# Patient Record
Sex: MALE | Race: WHITE
[De-identification: names, ages, dates, MRNs, and addresses within clinical notes are randomized per-mention and may not be internally consistent; named-entity substitution may affect disease eponyms.]

---

## 2020-05-22 ENCOUNTER — HOSPITAL ENCOUNTER (EMERGENCY)
Dept: HOSPITAL 53 - M ED | Age: 45
Discharge: HOME | End: 2020-05-22
Payer: COMMERCIAL

## 2020-05-22 VITALS — BODY MASS INDEX: 24.56 KG/M2 | WEIGHT: 191.36 LBS | HEIGHT: 74 IN

## 2020-05-22 VITALS — SYSTOLIC BLOOD PRESSURE: 120 MMHG | DIASTOLIC BLOOD PRESSURE: 67 MMHG

## 2020-05-22 DIAGNOSIS — M25.519: ICD-10-CM

## 2020-05-22 DIAGNOSIS — M54.2: Primary | ICD-10-CM

## 2020-05-22 DIAGNOSIS — R50.9: ICD-10-CM

## 2020-05-22 PROCEDURE — 99283 EMERGENCY DEPT VISIT LOW MDM: CPT

## 2020-05-22 PROCEDURE — 71045 X-RAY EXAM CHEST 1 VIEW: CPT

## 2020-05-22 PROCEDURE — 90471 IMMUNIZATION ADMIN: CPT

## 2020-05-22 PROCEDURE — 96374 THER/PROPH/DIAG INJ IV PUSH: CPT

## 2020-05-22 PROCEDURE — 90714 TD VACC NO PRESV 7 YRS+ IM: CPT

## 2020-05-22 NOTE — REP
Clinical:  Cough .

 

Comparison:  03/25/2003 .

 

Findings:

The mediastinum and cardiac silhouette are stable and within normal limits for

portable technique.  The lung fields are clear without acute consolidation,

effusion, or pneumothorax.  Skeletal structures are intact.

 

Impression:

No acute cardiopulmonary process appreciated.

 

 

Electronically Signed by

Lucas Ramirez MD 05/22/2020 03:03 A

## 2020-11-24 ENCOUNTER — HOSPITAL ENCOUNTER (OUTPATIENT)
Dept: HOSPITAL 53 - M LABSMTC | Age: 45
End: 2020-11-24
Attending: PEDIATRICS
Payer: SELF-PAY

## 2020-11-24 DIAGNOSIS — Z20.828: Primary | ICD-10-CM

## 2025-01-16 ENCOUNTER — HOSPITAL ENCOUNTER (OUTPATIENT)
Dept: HOSPITAL 53 - M OPP | Age: 50
Discharge: HOME | End: 2025-01-16
Attending: SURGERY
Payer: COMMERCIAL

## 2025-01-16 VITALS — WEIGHT: 186.8 LBS | HEIGHT: 73 IN | BODY MASS INDEX: 24.76 KG/M2

## 2025-01-16 VITALS — DIASTOLIC BLOOD PRESSURE: 57 MMHG | OXYGEN SATURATION: 97 % | SYSTOLIC BLOOD PRESSURE: 100 MMHG

## 2025-01-16 VITALS — TEMPERATURE: 97.3 F

## 2025-01-16 DIAGNOSIS — Z12.11: Primary | ICD-10-CM

## 2025-07-31 ENCOUNTER — HOSPITAL ENCOUNTER (OUTPATIENT)
Dept: HOSPITAL 53 - M WUC | Age: 50
End: 2025-07-31
Attending: PHYSICIAN ASSISTANT
Payer: COMMERCIAL

## 2025-07-31 ENCOUNTER — HOSPITAL ENCOUNTER (OUTPATIENT)
Dept: HOSPITAL 53 - M LAB REF | Age: 50
End: 2025-07-31
Attending: PHYSICIAN ASSISTANT
Payer: COMMERCIAL

## 2025-07-31 DIAGNOSIS — M25.561: Primary | ICD-10-CM

## 2025-07-31 DIAGNOSIS — M25.561: ICD-10-CM

## 2025-07-31 DIAGNOSIS — M25.461: ICD-10-CM

## 2025-07-31 DIAGNOSIS — R53.83: ICD-10-CM

## 2025-07-31 DIAGNOSIS — M11.261: Primary | ICD-10-CM

## 2025-07-31 LAB
B BURGDOR IGG SER IA-ACNC: (no result) INDEX (ref ?–0.9)
B BURGDOR IGM SER IA-ACNC: (no result) INDEX (ref ?–0.9)

## 2025-08-05 LAB — B BURGDOR IGG+IGM SER QL IA: <= 0.9 INDEX (ref ?–0.9)

## 2025-08-06 LAB
A PHAGOCYTOPH DNA BLD QL NAA+PROBE: NOT DETECTED
B MICROTI DNA BLD QL NAA+PROBE: NOT DETECTED
BORRELIA DNA BLD QL NAA+PROBE: NOT DETECTED
E CHAFFEENSIS DNA BLD QL NAA+PROBE: NOT DETECTED
SERVICE CMNT-IMP: (no result)